# Patient Record
Sex: FEMALE | Race: WHITE
[De-identification: names, ages, dates, MRNs, and addresses within clinical notes are randomized per-mention and may not be internally consistent; named-entity substitution may affect disease eponyms.]

---

## 2019-02-04 ENCOUNTER — HOSPITAL ENCOUNTER (EMERGENCY)
Dept: HOSPITAL 33 - ED | Age: 29
Discharge: HOME | End: 2019-02-04
Payer: MEDICAID

## 2019-02-04 VITALS — DIASTOLIC BLOOD PRESSURE: 61 MMHG | SYSTOLIC BLOOD PRESSURE: 129 MMHG | HEART RATE: 74 BPM

## 2019-02-04 VITALS — OXYGEN SATURATION: 97 %

## 2019-02-04 DIAGNOSIS — Z86.14: ICD-10-CM

## 2019-02-04 DIAGNOSIS — I88.9: Primary | ICD-10-CM

## 2019-02-04 LAB
ANION GAP SERPL CALC-SCNC: 13.3 MEQ/L (ref 5–15)
BASOPHILS # BLD AUTO: 0.02 10*3/UL (ref 0–0.4)
BASOPHILS NFR BLD AUTO: 0.2 % (ref 0–0.4)
BUN SERPL-MCNC: 9 MG/DL (ref 7–17)
CALCIUM SPEC-MCNC: 9 MG/DL (ref 8.4–10.2)
CHLORIDE SERPL-SCNC: 108 MMOL/L (ref 98–107)
CO2 SERPL-SCNC: 23 MMOL/L (ref 22–30)
CREAT SERPL-MCNC: 0.62 MG/DL (ref 0.52–1.04)
EOSINOPHIL # BLD AUTO: 0.2 10*3/UL (ref 0–0.5)
GLUCOSE SERPL-MCNC: 109 MG/DL (ref 74–106)
GRANULOCYTES # BLD AUTO: 5.94 10*3/UL (ref 1.4–6.9)
HCT VFR BLD AUTO: 44.4 % (ref 35–47)
HGB BLD-MCNC: 14.8 GM/DL (ref 12–16)
LYMPHOCYTES # SPEC AUTO: 2.11 10*3/UL (ref 1–4.6)
MCH RBC QN AUTO: 28 PG (ref 26–32)
MCHC RBC AUTO-ENTMCNC: 33.3 G/DL (ref 32–36)
MONOCYTES # BLD AUTO: 1.07 10*3/UL (ref 0–1.3)
NEUTROPHILS NFR BLD AUTO: 63.6 % (ref 36–66)
PLATELET # BLD AUTO: 259 K/MM3 (ref 150–450)
POTASSIUM SERPLBLD-SCNC: 4 MMOL/L (ref 3.5–5.1)
RBC # BLD AUTO: 5.28 M/MM3 (ref 4.1–5.4)
SODIUM SERPL-SCNC: 140 MMOL/L (ref 137–145)
WBC # BLD AUTO: 9.3 K/MM3 (ref 4–10.5)

## 2019-02-04 PROCEDURE — 85025 COMPLETE CBC W/AUTO DIFF WBC: CPT

## 2019-02-04 PROCEDURE — 96372 THER/PROPH/DIAG INJ SC/IM: CPT

## 2019-02-04 PROCEDURE — 81025 URINE PREGNANCY TEST: CPT

## 2019-02-04 PROCEDURE — 36415 COLL VENOUS BLD VENIPUNCTURE: CPT

## 2019-02-04 PROCEDURE — 80048 BASIC METABOLIC PNL TOTAL CA: CPT

## 2019-02-04 PROCEDURE — 99284 EMERGENCY DEPT VISIT MOD MDM: CPT

## 2019-02-04 NOTE — ERPHSYRPT
- History of Present Illness


Time Seen by Provider: 19 08:10


Source: patient


Exam Limitations: no limitations


Patient Subjective Stated Complaint: here for a possible abscess to right groin 

area for 10 days now.


Triage Nursing Assessment: pt has tenderness to right groin area, no swelling, 

drainage noted. pt alert, and walked in ,


Physician History: 





The patient is a 28-year-old female complaining of an abscess to the right 

groin that began 10 days ago.  Early in the infection, the area was red and 

raised.  She has a history of MRSA.  She thought this was another MRSA 

infection.  She took ibuprofen and put hot compresses on the region.  The 

swelling began to become smaller.  There was no drainage of the abscess.  This 

morning when she woke up, it was very painful for her to walk.  It was also 

very painful to touch the area.  As per our discussion, she would like to try a 

trial of antibiotics today without incision and drainage. She states that she 

did not have a fever. 





Her past medical history is significant for MRSA infections.


Timing/Duration: day(s) (10), gradual onset, improved (but now painful)


Quality: painful


Severity: moderate


Location: other (right groin)


Possible Causes: no cause identified


Associated Symptoms: denies symptoms


Allergies/Adverse Reactions: 








No Known Drug Allergies Allergy (Unverified 10/22/16 12:22)


 





Hx Tetanus, Diphtheria Vaccination/Date Given: Yes


Hx Influenza Vaccination/Date Given: Yes


Hx Pneumococcal Vaccination/Date Given: No


Immunizations Up to Date: Yes





- Review of Systems


Constitutional: No Fever, No Chills


Eyes: No Symptoms


Ears, Nose, & Throat: No Symptoms


Respiratory: No Cough, No Dyspnea


Cardiac: No Chest Pain, No Edema, No Syncope


Abdominal/Gastrointestinal: No Abdominal Pain, No Nausea, No Vomiting, No 

Diarrhea


Genitourinary Symptoms: No Dysuria


Musculoskeletal: No Back Pain, No Neck Pain


Skin: Induration


Neurological: No Dizziness, No Focal Weakness, No Sensory Changes


Psychological: No Symptoms


Endocrine: No Symptoms


Hematologic/Lymphatic: No Symptoms


Immunological/Allergic: No Symptoms


All Other Systems: Reviewed and Negative





- Past Medical History


Pertinent Past Medical History: No


Neurological History: No Pertinent History


ENT History: No Pertinent History


Cardiac History: No Pertinent History


Respiratory History: No Pertinent History


Endocrine Medical History: No Pertinent History


Musculoskeletal History: No Pertinent History


GI Medical History: No Pertinent History


 History: No Pertinent History


Psycho-Social History: No Pertinent History


Female Reproductive Disorders: No Pertinent History





- Past Surgical History


Past Surgical History: Yes


Neuro Surgical History: No Pertinent History


Cardiac: No Pertinent History


Respiratory: No Pertinent History


Gastrointestinal: Appendectomy


Genitourinary: No Pertinent History


Musculoskeletal: Orthopedic Surgery


Female Surgical History:  Section


Other Surgical History: r elbow





- Social History


Smoking Status: Current every day smoker


How long have you smoked: 4 years


Exposure to second hand smoke: Yes


Drug Use: none


Patient Lives Alone: Yes





- Female History


Hx Last Menstrual Period: 11 days ago


Hx Pregnant Now: No





- Nursing Vital Signs


Nursing Vital Signs: 


 Initial Vital Signs











Temperature  97.9 F   19 07:48


 


Pulse Rate  82   19 07:48


 


Respiratory Rate  16   19 07:48


 


Blood Pressure  115/80   19 07:48


 


O2 Sat by Pulse Oximetry  97   19 07:48








 Pain Scale











Pain Intensity                 4

















- Physical Exam


General Appearance: no apparent distress, alert


Eye Exam: PERRL/EOMI, eyes nml inspection


Ears, Nose, Throat Exam: normal ENT inspection, pharynx normal, moist mucous 

membranes


Neck Exam: normal inspection, non-tender, supple, full range of motion


Respiratory Exam: normal breath sounds, lungs clear, No respiratory distress


Cardiovascular Exam: regular rate/rhythm, normal heart sounds


Gastrointestinal/Abdomen Exam: soft, mass, No tenderness


Pelvic Exam: not done


Rectal Exam: not done


Back Exam: normal inspection, normal range of motion, No CVA tenderness, No 

vertebral tenderness


Extremity Exam: normal inspection, normal range of motion


Neurologic Exam: alert, oriented x 3, cooperative, normal mood/affect, 

sensation nml, No motor deficits


Skin Exam: other (Examination of the fold of the right groin shows a healed 

small wound to the mid groin fold.  There is very small palpable tender 0.5 x 1 

cm hard mass.  This mass is exquisitely tender to light palpation.  There is no 

fluctuance.)


**SpO2 Interpretation**: normal


SpO2: 97


Ordered Tests: 


 Active Orders 24 hr











 Category Date Time Status


 


 BMP Stat Lab  19 08:10 Completed


 


 CBC W DIFF Stat Lab  19 08:10 Completed


 


 HCG QUALITATIVE,SERUM Stat Lab  19 08:10 Completed








Medication Summary














Discontinued Medications














Generic Name Dose Route Start Last Admin





  Trade Name Freq  PRN Reason Stop Dose Admin


 


Ketorolac Tromethamine  60 mg  19 08:12  19 08:20





  Toradol 30 Mg Injection***  IM  19 08:13  60 mg





  STAT ONE   Administration





     





     





     





     


 


Ketorolac Tromethamine  Confirm  19 08:19  





  Toradol 30 Mg Injection***  Administered  19 08:20  





  Dose   





  60 mg   





  .ROUTE   





  .STK-MED ONE   





     





     





     





     











Lab/Rad Data: 


 Laboratory Result Diagrams





 19 08:10 





 19 08:10 





 Laboratory Results











  19 Range/Units





  08:10 08:10 08:10 


 


WBC    9.3  (4.0-10.5)  K/mm3


 


RBC    5.28  (4.1-5.4)  M/mm3


 


Hgb    14.8  (12.0-16.0)  gm/dl


 


Hct    44.4  (35-47)  %


 


MCV    84.1  ()  fl


 


MCH    28.0  (26-32)  pg


 


MCHC    33.3  (32-36)  g/dl


 


RDW    14.6 H  (11.5-14.0)  %


 


Plt Count    259  (150-450)  K/mm3


 


MPV    9.9 H  (6-9.5)  fl


 


Gran %    63.6  (36.0-66.0)  %


 


Eos # (Auto)    0.20  (0-0.5)  


 


Absolute Lymphs (auto)    2.11  (1.0-4.6)  


 


Absolute Monos (auto)    1.07  (0.0-1.3)  


 


Lymphocytes %    22.6 L  (24.0-44.0)  %


 


Monocytes %    11.5  (0.0-12.0)  %


 


Eosinophils %    2.1  (0.00-5.0)  %


 


Basophils %    0.2  (0.0-0.4)  %


 


Absolute Granulocytes    5.94  (1.4-6.9)  


 


Basophils #    0.02  (0-0.4)  


 


Sodium   140   (137-145)  mmol/L


 


Potassium   4.0   (3.5-5.1)  mmol/L


 


Chloride   108 H   ()  mmol/L


 


Carbon Dioxide   23   (22-30)  mmol/L


 


Anion Gap   13.3   (5-15)  MEQ/L


 


BUN   9   (7-17)  mg/dL


 


Creatinine   0.62   (0.52-1.04)  mg/dL


 


Estimated GFR   > 60.0   ML/MIN


 


Glucose   109 H   ()  mg/dL


 


Calcium   9.0   (8.4-10.2)  mg/dL


 


Serum Pregnancy, Qual  NEGATIVE    (Negative)  














- Progress


Progress: improved


Progress Note: 





19 08:36


The tender palpable mass in the right groin could be a small painful indurated 

abscess.  However, it also could be a tender lymph node.  I discussed treatment 

options with the patient.  She prefers to have a trial of antibiotics today and 

reassess over the next few days.


19 09:10








Pt better after toradol 60 mg IM.


Counseled pt/family regarding: diagnosis, need for follow-up





- Departure


Time of Disposition: 09:12


Departure Disposition: Home


Clinical Impression: 


 Lymphadenitis





Condition: Stable


Critical Care Time: No


Referrals: 


BHAVESH ROBBINS MD [Primary Care Provider] - 


Additional Instructions: 


You likely have a swollen lymph node as result of the abscess that you had for 

10 days.  You were given Toradol 60 mg by IM in the ER.  As per our discussion, 

we will try clindamycin 300 mg 4 times a day for 10 days.  You may also take 

Tylenol No. 3 one tablet every 4-6 hours as needed for pain.  Take Tylenol and 

ibuprofen as needed as well.  Follow-up with your primary medical doctor in one 

to 2 days.


Prescriptions: 


Clindamycin HCl 300 mg PO QID #40 capsule


Codeine Phosphate/APAP #3** [Tylenol #3 Tablet**] 1 tab PO Q4-6HPRN PRN #6 

tablet


 PRN Reason: Mild To Moderate Pain

## 2019-06-27 ENCOUNTER — HOSPITAL ENCOUNTER (EMERGENCY)
Dept: HOSPITAL 33 - ED | Age: 29
Discharge: HOME | End: 2019-06-27
Payer: MEDICAID

## 2019-06-27 VITALS — SYSTOLIC BLOOD PRESSURE: 120 MMHG | OXYGEN SATURATION: 95 % | DIASTOLIC BLOOD PRESSURE: 76 MMHG | HEART RATE: 89 BPM

## 2019-06-27 DIAGNOSIS — N39.0: ICD-10-CM

## 2019-06-27 DIAGNOSIS — R10.9: Primary | ICD-10-CM

## 2019-06-27 DIAGNOSIS — N83.202: ICD-10-CM

## 2019-06-27 LAB
ALBUMIN SERPL-MCNC: 4.1 G/DL (ref 3.5–5)
ALP SERPL-CCNC: 71 U/L (ref 38–126)
ALT SERPL-CCNC: 24 U/L (ref 0–35)
AMYLASE SERPL-CCNC: 96 U/L (ref 30–110)
ANION GAP SERPL CALC-SCNC: 12.6 MEQ/L (ref 5–15)
AST SERPL QL: 19 U/L (ref 14–36)
BASOPHILS # BLD AUTO: 0.03 10*3/UL (ref 0–0.4)
BASOPHILS NFR BLD AUTO: 0.3 % (ref 0–0.4)
BILIRUB BLD-MCNC: 0.3 MG/DL (ref 0.2–1.3)
BUN SERPL-MCNC: 8 MG/DL (ref 7–17)
CALCIUM SPEC-MCNC: 9.5 MG/DL (ref 8.4–10.2)
CHLORIDE SERPL-SCNC: 106 MMOL/L (ref 98–107)
CO2 SERPL-SCNC: 25 MMOL/L (ref 22–30)
CREAT SERPL-MCNC: 0.62 MG/DL (ref 0.52–1.04)
EOSINOPHIL # BLD AUTO: 0.23 10*3/UL (ref 0–0.5)
GLUCOSE SERPL-MCNC: 101 MG/DL (ref 74–106)
GLUCOSE UR-MCNC: NEGATIVE MG/DL
GRANULOCYTES # BLD AUTO: 6.09 10*3/UL (ref 1.4–6.9)
HCT VFR BLD AUTO: 42.8 % (ref 35–47)
HGB BLD-MCNC: 14.5 GM/DL (ref 12–16)
LYMPHOCYTES # SPEC AUTO: 2.98 10*3/UL (ref 1–4.6)
MCH RBC QN AUTO: 28.8 PG (ref 26–32)
MCHC RBC AUTO-ENTMCNC: 33.9 G/DL (ref 32–36)
MONOCYTES # BLD AUTO: 1.04 10*3/UL (ref 0–1.3)
NEUTROPHILS NFR BLD AUTO: 58.8 % (ref 36–66)
PLATELET # BLD AUTO: 246 K/MM3 (ref 150–450)
POTASSIUM SERPLBLD-SCNC: 3.6 MMOL/L (ref 3.5–5.1)
PROT SERPL-MCNC: 7.1 G/DL (ref 6.3–8.2)
PROT UR STRIP-MCNC: NEGATIVE MG/DL
RBC # BLD AUTO: 5.04 M/MM3 (ref 4.1–5.4)
RBC #/AREA URNS HPF: (no result) /HPF (ref 0–2)
SODIUM SERPL-SCNC: 140 MMOL/L (ref 137–145)
WBC # BLD AUTO: 10.4 K/MM3 (ref 4–10.5)
WBC #/AREA URNS HPF: (no result) /HPF (ref 0–5)

## 2019-06-27 PROCEDURE — 85025 COMPLETE CBC W/AUTO DIFF WBC: CPT

## 2019-06-27 PROCEDURE — 87086 URINE CULTURE/COLONY COUNT: CPT

## 2019-06-27 PROCEDURE — 74176 CT ABD & PELVIS W/O CONTRAST: CPT

## 2019-06-27 PROCEDURE — 96360 HYDRATION IV INFUSION INIT: CPT

## 2019-06-27 PROCEDURE — 83690 ASSAY OF LIPASE: CPT

## 2019-06-27 PROCEDURE — 84703 CHORIONIC GONADOTROPIN ASSAY: CPT

## 2019-06-27 PROCEDURE — 36415 COLL VENOUS BLD VENIPUNCTURE: CPT

## 2019-06-27 PROCEDURE — 36000 PLACE NEEDLE IN VEIN: CPT

## 2019-06-27 PROCEDURE — 96376 TX/PRO/DX INJ SAME DRUG ADON: CPT

## 2019-06-27 PROCEDURE — 83605 ASSAY OF LACTIC ACID: CPT

## 2019-06-27 PROCEDURE — 82150 ASSAY OF AMYLASE: CPT

## 2019-06-27 PROCEDURE — 99284 EMERGENCY DEPT VISIT MOD MDM: CPT

## 2019-06-27 PROCEDURE — 96374 THER/PROPH/DIAG INJ IV PUSH: CPT

## 2019-06-27 PROCEDURE — 96375 TX/PRO/DX INJ NEW DRUG ADDON: CPT

## 2019-06-27 PROCEDURE — 96365 THER/PROPH/DIAG IV INF INIT: CPT

## 2019-06-27 PROCEDURE — 80053 COMPREHEN METABOLIC PANEL: CPT

## 2019-06-27 PROCEDURE — 81001 URINALYSIS AUTO W/SCOPE: CPT

## 2019-06-27 NOTE — ERPHSYRPT
- History of Present Illness


Time Seen by Provider: 19 20:50


Historian: patient, family


Exam Limitations: no limitations


Physician History: 





29 y/o white female presents with bilat flank pain. began this am and worse. no 

vaginal bleeding or hematuria today but in the last 3 months, pt states she has 

had intermittent vaginal and rectal bleeding. no sig abd pain. no vomiting. 

much worse than any uti in past


Timing/Duration: today, constant, sudden, worse


Quality: sharpness, stabbing


Abdominal Pain Onset Location: suprapubic, flank (bilateral)


Pain Radiation: flank


Severity of Pain-Max: moderate


Severity of Pain-Current: moderate


Modifying Factors: Improves With: nothing


Associated Symptoms: No nausea, No vomiting


Previous symptoms: no prior history


Allergies/Adverse Reactions: 








No Known Drug Allergies Allergy (Unverified 10/22/16 12:22)


 





Hx Tetanus, Diphtheria Vaccination/Date Given: Yes


Hx Influenza Vaccination/Date Given: Yes


Hx Pneumococcal Vaccination/Date Given: No





- Review of Systems


Constitutional: No Symptoms


Eyes: No Symptoms


Ears, Nose, & Throat: No Symptoms


Respiratory: No Symptoms


Cardiac: No Symptoms


Abdominal/Gastrointestinal: Abdominal Pain (suprapubic pain)


Genitourinary Symptoms: No Symptoms


Musculoskeletal: Back Pain (bilat flank)


Skin: No Symptoms


Neurological: No Symptoms


Psychological: No Symptoms


Endocrine: No Symptoms


Hematologic/Lymphatic: No Symptoms


Immunological/Allergic: No Symptoms


All Other Systems: Reviewed and Negative





- Past Medical History


Pertinent Past Medical History: No


Neurological History: No Pertinent History


ENT History: No Pertinent History


Cardiac History: No Pertinent History


Respiratory History: No Pertinent History


Endocrine Medical History: No Pertinent History


Musculoskeletal History: No Pertinent History


GI Medical History: No Pertinent History


 History: No Pertinent History


Psycho-Social History: No Pertinent History


Female Reproductive Disorders: No Pertinent History





- Past Surgical History


Past Surgical History: Yes


Neuro Surgical History: No Pertinent History


Cardiac: No Pertinent History


Respiratory: No Pertinent History


Gastrointestinal: Appendectomy


Genitourinary: No Pertinent History


Musculoskeletal: Orthopedic Surgery


Female Surgical History:  Section


Other Surgical History: r elbow





- Social History


Smoking Status: Current every day smoker


How long have you smoked: 4 years


Exposure to second hand smoke: Yes


Drug Use: none


Patient Lives Alone: Yes





- Nursing Vital Signs


Nursing Vital Signs: 


 Initial Vital Signs











Pulse Rate  61   19 20:50


 


Respiratory Rate  16   19 20:50


 


Blood Pressure  140/82   19 20:50


 


O2 Sat by Pulse Oximetry  98   19 20:50








 Pain Scale











Pain Intensity                 3

















- Physical Exam


General Appearance: moderate distress, alert, anxiety


Eye Exam: PERRL/EOMI, eyes nml inspection


Ears, Nose, Throat Exam: normal ENT inspection, moist mucous membranes


Neck Exam: normal inspection, non-tender, supple, full range of motion


Respiratory Exam: normal breath sounds, lungs clear, airway intact, No chest 

tenderness, No respiratory distress


Cardiovascular Exam: regular rate/rhythm, normal heart sounds, normal 

peripheral pulses


Gastrointestinal/Abdomen Exam: soft, tenderness (suprapubic), guarding, No 

rebound


Pelvic Exam: not done


Rectal Exam: not done


Back Exam: normal inspection, normal range of motion, CVA tenderness (bilat), 

No vertebral tenderness


Extremity Exam: normal inspection, normal range of motion, pelvis stable


Neurologic Exam: alert, cooperative, CNs II-XII nml as tested


Skin Exam: normal color, warm, dry


Lymphatic Exam: No adenopathy


**SpO2 Interpretation**: normal


O2 Delivery: Room Air





- Course


Nursing assessment & vital signs reviewed: Yes


Ordered Tests: 


 Active Orders 24 hr











 Category Date Time Status


 


 IV Insertion STAT Care  19 20:59 Active


 


 ABDOMEN AND PELVIS W/0 CONTRAS [CT] Stat Exams  19 21:35 Taken


 


 AMYLASE Stat Lab  19 21:23 Completed


 


 CBC W DIFF Stat Lab  19 21:23 Completed


 


 CMP Stat Lab  19 21:23 Completed


 


 CULTURE,URINE Stat Lab  19 22:15 Received


 


 HCG,QUALITATIVE URINE Stat Lab  19 22:15 Completed


 


 LIPASE Stat Lab  19 21:23 Completed


 


 Lactic Acid Stat Lab  19 21:16 Completed


 


 UA W/RFX UR CULTURE Stat Lab  19 22:15 Completed








Medication Summary











Generic Name Dose Route Start Last Admin





  Trade Name Freq  PRN Reason Stop Dose Admin


 


Ceftriaxone Sodium/Dextrose  1 g in 50 mls @ 100 mls/hr  19 22:43  





  Rocephin 1 Gm-D5w 50 Ml Bag**  IV  19 23:12  





  STAT STA   





     





     





     





     














Discontinued Medications














Generic Name Dose Route Start Last Admin





  Trade Name Freq  PRN Reason Stop Dose Admin


 


Hydromorphone HCl  1 mg  19 20:59  19 21:20





  Hydromorphone 1 Mg/Ml Ampule***  IV  19 21:00  1 mg





  STAT ONE   Administration





     





     





     





     


 


Hydromorphone HCl  Confirm  19 21:17  





  Hydromorphone 1 Mg/Ml Ampule***  Administered  19 21:18  





  Dose   





  1 mg   





  .ROUTE   





  .STK-MED ONE   





     





     





     





     


 


Sodium Chloride  1,000 mls @ 999 mls/hr  19 20:59  19 21:20





  Sodium Chloride 0.9% 1000 Ml  IV  19 21:59  999 mls/hr





  .Q1H1M STA   Administration





     





     





     





     


 


Sodium Chloride  Confirm  19 21:17  





  Sodium Chloride 0.9% 1000 Ml  Administered  19 21:18  





  Dose   





  1,000 mls @ ud   





  .ROUTE   





  .STK-MED ONE   





     





     





     





     


 


Ondansetron HCl  4 mg  19 20:59  19 21:20





  Zofran 4 Mg/2 Ml Vial**  IV  19 21:00  4 mg





  STAT ONE   Administration





     





     





     





     


 


Ondansetron HCl  Confirm  19 21:16  





  Zofran 4 Mg/2 Ml Vial**  Administered  19 21:17  





  Dose   





  4 mg   





  .ROUTE   





  .STK-MED ONE   





     





     





     





     











Lab/Rad Data: 


 Laboratory Result Diagrams





 19 21:23 





 19 21:23 





 Laboratory Results











  19 Range/Units





  22:15 22:15 21:23 


 


WBC     (4.0-10.5)  K/mm3


 


RBC     (4.1-5.4)  M/mm3


 


Hgb     (12.0-16.0)  gm/dl


 


Hct     (35-47)  %


 


MCV     ()  fl


 


MCH     (26-32)  pg


 


MCHC     (32-36)  g/dl


 


RDW     (11.5-14.0)  %


 


Plt Count     (150-450)  K/mm3


 


MPV     (6-9.5)  fl


 


Gran %     (36.0-66.0)  %


 


Eos # (Auto)     (0-0.5)  


 


Absolute Lymphs (auto)     (1.0-4.6)  


 


Absolute Monos (auto)     (0.0-1.3)  


 


Lymphocytes %     (24.0-44.0)  %


 


Monocytes %     (0.0-12.0)  %


 


Eosinophils %     (0.00-5.0)  %


 


Basophils %     (0.0-0.4)  %


 


Absolute Granulocytes     (1.4-6.9)  


 


Basophils #     (0-0.4)  


 


Sodium    140  (137-145)  mmol/L


 


Potassium    3.6  (3.5-5.1)  mmol/L


 


Chloride    106  ()  mmol/L


 


Carbon Dioxide    25  (22-30)  mmol/L


 


Anion Gap    12.6  (5-15)  MEQ/L


 


BUN    8  (7-17)  mg/dL


 


Creatinine    0.62  (0.52-1.04)  mg/dL


 


Estimated GFR    > 60.0  ML/MIN


 


Glucose    101  ()  mg/dL


 


Lactic Acid     (0.4-2.0)  


 


Calcium    9.5  (8.4-10.2)  mg/dL


 


Total Bilirubin    0.30  (0.2-1.3)  mg/dL


 


AST    19  (14-36)  U/L


 


ALT    24  (0-35)  U/L


 


Alkaline Phosphatase    71  ()  U/L


 


Serum Total Protein    7.1  (6.3-8.2)  g/dL


 


Albumin    4.1  (3.5-5.0)  g/dL


 


Amylase    96  ()  U/L


 


Lipase    70  ()  U/L


 


Urine Color   YELLOW   (YELLOW)  


 


Urine Appearance   CLOUDY   (CLEAR)  


 


Urine pH   5.0   (5-6)  


 


Ur Specific Gravity   1.028   (1.005-1.025)  


 


Urine Protein   NEGATIVE   (Negative)  


 


Urine Ketones   NEGATIVE   (NEGATIVE)  


 


Urine Blood   NEGATIVE   (0-5)  Marvin/ul


 


Urine Nitrite   NEGATIVE   (NEGATIVE)  


 


Urine Bilirubin   NEGATIVE   (NEGATIVE)  


 


Urine Urobilinogen   NEGATIVE   (0-1)  mg/dL


 


Ur Leukocyte Esterase   MODERATE   (NEGATIVE)  


 


Urine WBC (Auto)      (0-5)  /HPF


 


Urine RBC (Auto)   26-50   (0-2)  /HPF


 


U Epithel Cells (Auto)   FEW   (FEW)  /HPF


 


Urine Bacteria (Auto)   FEW   (NEGATIVE)  /HPF


 


Urine Mucus (Auto)   MANY   (NEGATIVE)  /HPF


 


Urine Culture Reflexed   YES   (NO)  


 


Urine Glucose   NEGATIVE   (NEGATIVE)  mg/dL


 


Urine HCG, Qual  NEGATIVE    (Negative)  














  19 Range/Units





  21:23 21:16 


 


WBC  10.4   (4.0-10.5)  K/mm3


 


RBC  5.04   (4.1-5.4)  M/mm3


 


Hgb  14.5   (12.0-16.0)  gm/dl


 


Hct  42.8   (35-47)  %


 


MCV  84.9   ()  fl


 


MCH  28.8   (26-32)  pg


 


MCHC  33.9   (32-36)  g/dl


 


RDW  14.2 H   (11.5-14.0)  %


 


Plt Count  246   (150-450)  K/mm3


 


MPV  10.0 H   (6-9.5)  fl


 


Gran %  58.8   (36.0-66.0)  %


 


Eos # (Auto)  0.23   (0-0.5)  


 


Absolute Lymphs (auto)  2.98   (1.0-4.6)  


 


Absolute Monos (auto)  1.04   (0.0-1.3)  


 


Lymphocytes %  28.7   (24.0-44.0)  %


 


Monocytes %  10.0   (0.0-12.0)  %


 


Eosinophils %  2.2   (0.00-5.0)  %


 


Basophils %  0.3   (0.0-0.4)  %


 


Absolute Granulocytes  6.09   (1.4-6.9)  


 


Basophils #  0.03   (0-0.4)  


 


Sodium    (137-145)  mmol/L


 


Potassium    (3.5-5.1)  mmol/L


 


Chloride    ()  mmol/L


 


Carbon Dioxide    (22-30)  mmol/L


 


Anion Gap    (5-15)  MEQ/L


 


BUN    (7-17)  mg/dL


 


Creatinine    (0.52-1.04)  mg/dL


 


Estimated GFR    ML/MIN


 


Glucose    ()  mg/dL


 


Lactic Acid   1.2  (0.4-2.0)  


 


Calcium    (8.4-10.2)  mg/dL


 


Total Bilirubin    (0.2-1.3)  mg/dL


 


AST    (14-36)  U/L


 


ALT    (0-35)  U/L


 


Alkaline Phosphatase    ()  U/L


 


Serum Total Protein    (6.3-8.2)  g/dL


 


Albumin    (3.5-5.0)  g/dL


 


Amylase    ()  U/L


 


Lipase    ()  U/L


 


Urine Color    (YELLOW)  


 


Urine Appearance    (CLEAR)  


 


Urine pH    (5-6)  


 


Ur Specific Gravity    (1.005-1.025)  


 


Urine Protein    (Negative)  


 


Urine Ketones    (NEGATIVE)  


 


Urine Blood    (0-5)  Marvin/ul


 


Urine Nitrite    (NEGATIVE)  


 


Urine Bilirubin    (NEGATIVE)  


 


Urine Urobilinogen    (0-1)  mg/dL


 


Ur Leukocyte Esterase    (NEGATIVE)  


 


Urine WBC (Auto)    (0-5)  /HPF


 


Urine RBC (Auto)    (0-2)  /HPF


 


U Epithel Cells (Auto)    (FEW)  /HPF


 


Urine Bacteria (Auto)    (NEGATIVE)  /HPF


 


Urine Mucus (Auto)    (NEGATIVE)  /HPF


 


Urine Culture Reflexed    (NO)  


 


Urine Glucose    (NEGATIVE)  mg/dL


 


Urine HCG, Qual    (Negative)  














- Progress


Progress: improved


Progress Note: 





19 22:44


ct abd/pelvis-3.7 cm left ovarian cyst; tiny amt of culdesac fluid. new tiny 

gallstone. 


Counseled pt/family regarding: lab results, diagnosis, need for follow-up, rad 

results





- Departure


Departure Disposition: Home


Clinical Impression: 


 Abdominal pain, UTI (urinary tract infection), Left ovarian cyst





Condition: Stable


Critical Care Time: No


Referrals: 


BHAVESH ROBBINS MD [Primary Care Provider] - 


Additional Instructions: 


drink plenty of fluids. follow up primary doctor for further management of your 

abdominal issues and ovarian cyst. 


Prescriptions: 


Hydrocodone/APAP 5/325*** [Norco 5/325 mg***] 1 each PO Q8H PRN PRN #6 tablet 

MDD 3


 PRN Reason: Pain


Ciprofloxacin [Cipro 500 MG***] 500 mg PO BID #14 tablet

## 2020-07-09 ENCOUNTER — HOSPITAL ENCOUNTER (EMERGENCY)
Dept: HOSPITAL 33 - ED | Age: 30
Discharge: HOME | End: 2020-07-09
Payer: MEDICAID

## 2020-07-09 VITALS — SYSTOLIC BLOOD PRESSURE: 137 MMHG | OXYGEN SATURATION: 97 % | DIASTOLIC BLOOD PRESSURE: 81 MMHG | HEART RATE: 83 BPM

## 2020-07-09 DIAGNOSIS — H00.012: Primary | ICD-10-CM

## 2020-07-09 PROCEDURE — 99283 EMERGENCY DEPT VISIT LOW MDM: CPT

## 2020-07-09 NOTE — ERPHSYRPT
- History of Present Illness


Time Seen by Provider: 20 16:42


Source: patient


Exam Limitations: no limitations


Physician History: 





29 years old female presented in the ER with chief complaint of right eye lower 

lid swelling with some burning aching pain.  Patient report she started to have 

some discomfort and right lower eyelid yesterday and today has noticed small 

swelling which is gradually increasing.  No difficulty vision or movements of 

eyeball.  Denies any discharge.


Timing/Duration: yesterday, gradual onset, worse


Location: right eye


Severity: mild


Apparent Injury: no


Associated Symptoms: pain, burning, itching, redness


Visual Assistive Devices: None


Chemical Exposure: No


Trauma: No


[place]/Tanning Bed Exposure: No


Allergies/Adverse Reactions: 








No Known Drug Allergies Allergy (Verified 20 16:50)


   





Home Medications: 








No Reportable Medications [No Reported Medications]  20 [History]





Hx Tetanus, Diphtheria Vaccination/Date Given: Yes


Hx Influenza Vaccination/Date Given: Yes


Hx Pneumococcal Vaccination/Date Given: No





- Review of Systems


Constitutional: No Symptoms


Eyes: Eye Pain, Itchy


Ears, Nose, & Throat: No Symptoms


Respiratory: No Symptoms


Cardiac: No Symptoms


Abdominal/Gastrointestinal: No Symptoms


Musculoskeletal: No Symptoms


Skin: No Symptoms


Neurological: No Symptoms


Psychological: No Symptoms


Endocrine: No Symptoms


Hematologic/Lymphatic: No Symptoms


Immunological/Allergic: No Symptoms





- Past Medical History


Pertinent Past Medical History: No


Neurological History: No Pertinent History


ENT History: No Pertinent History


Cardiac History: No Pertinent History


Respiratory History: No Pertinent History


Endocrine Medical History: No Pertinent History


Musculoskeletal History: No Pertinent History


GI Medical History: No Pertinent History


 History: No Pertinent History


Psycho-Social History: No Pertinent History


Female Reproductive Disorders: No Pertinent History


Other Medical History: kidney infection





- Past Surgical History


Past Surgical History: Yes


Neuro Surgical History: No Pertinent History


Cardiac: No Pertinent History


Respiratory: No Pertinent History


Gastrointestinal: Appendectomy


Genitourinary: No Pertinent History


Musculoskeletal: Orthopedic Surgery


Female Surgical History:  Section


Other Surgical History: r elbow





- Social History


Smoking Status: Current every day smoker


How long have you smoked: 4 years


Exposure to second hand smoke: Yes


Drug Use: none


Patient Lives Alone: Yes





- Nursing Vital Signs


Nursing Vital Signs: 


                               Initial Vital Signs











Temperature  98.5 F   20 16:41


 


Pulse Rate  83   20 16:41


 


Blood Pressure  137/81   20 16:41


 


O2 Sat by Pulse Oximetry  97   20 16:41








                                   Pain Scale











Pain Intensity                 3

















- Physical Exam


General Appearance: no apparent distress, alert


Vision Acuity Degree Evaluation Phase: Uncorrected


Vision Acuity Right Eye: 20/30


Vision Acuity Left Eye: 20/50


Eye Exam: right eye: eyelid inflammation (Right lower lid medial and bumpy 

inflammation.  Warm and tender), stye, left eye: normal inspection, PERRL, EOMI


Ears, Nose, Throat Exam: normal ENT inspection, TMs normal, pharynx normal


Neck Exam: normal inspection, non-tender, supple, full range of motion


Respiratory Exam: normal breath sounds, lungs clear


Cardiovascular Exam: regular rate/rhythm, normal heart sounds


Neurologic: alert, oriented x 3, cooperative, CNs II-XII nml as tested, normal 

mood/affect, nml cerebellar function


Skin Exam: normal color


**SpO2 Interpretation**: normal


O2 Delivery: Room Air





- Course


Nursing assessment & vital signs reviewed: Yes





- Progress


Progress: unchanged


Progress Note: 





20 16:54


She has stye.  Recommended supportive care with moist warm compresses and 

outpatient follow-up.  Discussed signs symptoms of worsening needing return 

which she seemed understanding.


Counseled pt/family regarding: diagnosis, need for follow-up





- Departure


Departure Disposition: Home


Clinical Impression: 


Hordeolum externum (stye)


Qualifiers:


 Laterality: right Eyelid: lower Qualified Code(s): H00.012 - Hordeolum externum

 right lower eyelid





Condition: Stable


Critical Care Time: No


Referrals: 


BHAVESH ROBBINS MD [Primary Care Provider] - Follow Up with PCP/3 days


Instructions:  Stye (DC)


Additional Instructions: 


Apply moist warm compresses for 5 to 10 minutes three to five times per day in 

order to facilitate drainage. Massage and gentle wiping of the affected eyelid 

after the warm compress can also aid in drainage. Patients should discontinue 

eye makeup to support healing.  Follow-up with primary care for reevaluation.

## 2020-10-15 ENCOUNTER — HOSPITAL ENCOUNTER (EMERGENCY)
Dept: HOSPITAL 33 - ED | Age: 30
LOS: 1 days | Discharge: HOME | End: 2020-10-16
Payer: MEDICAID

## 2020-10-15 DIAGNOSIS — N94.89: ICD-10-CM

## 2020-10-15 DIAGNOSIS — D72.829: ICD-10-CM

## 2020-10-15 DIAGNOSIS — K64.4: ICD-10-CM

## 2020-10-15 DIAGNOSIS — J84.10: ICD-10-CM

## 2020-10-15 DIAGNOSIS — N39.0: ICD-10-CM

## 2020-10-15 DIAGNOSIS — K92.1: Primary | ICD-10-CM

## 2020-10-15 DIAGNOSIS — K42.9: ICD-10-CM

## 2020-10-15 DIAGNOSIS — R91.1: ICD-10-CM

## 2020-10-15 PROCEDURE — 99284 EMERGENCY DEPT VISIT MOD MDM: CPT

## 2020-10-15 PROCEDURE — 81001 URINALYSIS AUTO W/SCOPE: CPT

## 2020-10-15 PROCEDURE — 85025 COMPLETE CBC W/AUTO DIFF WBC: CPT

## 2020-10-15 PROCEDURE — 83690 ASSAY OF LIPASE: CPT

## 2020-10-15 PROCEDURE — 96372 THER/PROPH/DIAG INJ SC/IM: CPT

## 2020-10-15 PROCEDURE — 80053 COMPREHEN METABOLIC PANEL: CPT

## 2020-10-15 PROCEDURE — 96360 HYDRATION IV INFUSION INIT: CPT

## 2020-10-15 PROCEDURE — 36415 COLL VENOUS BLD VENIPUNCTURE: CPT

## 2020-10-15 PROCEDURE — 36000 PLACE NEEDLE IN VEIN: CPT

## 2020-10-15 PROCEDURE — 74177 CT ABD & PELVIS W/CONTRAST: CPT

## 2020-10-16 VITALS — OXYGEN SATURATION: 97 % | HEART RATE: 82 BPM

## 2020-10-16 VITALS — DIASTOLIC BLOOD PRESSURE: 78 MMHG | SYSTOLIC BLOOD PRESSURE: 121 MMHG

## 2020-10-16 LAB
ALBUMIN SERPL-MCNC: 4.2 G/DL (ref 3.5–5)
ALP SERPL-CCNC: 69 U/L (ref 38–126)
ALT SERPL-CCNC: 22 U/L (ref 0–35)
ANION GAP SERPL CALC-SCNC: 9.8 MEQ/L (ref 5–15)
AST SERPL QL: 19 U/L (ref 14–36)
BASOPHILS # BLD AUTO: 0.03 10*3/UL (ref 0–0.4)
BASOPHILS NFR BLD AUTO: 0.2 % (ref 0–0.4)
BILIRUB BLD-MCNC: 0.2 MG/DL (ref 0.2–1.3)
BUN SERPL-MCNC: 9 MG/DL (ref 7–17)
CALCIUM SPEC-MCNC: 9.3 MG/DL (ref 8.4–10.2)
CHLORIDE SERPL-SCNC: 106 MMOL/L (ref 98–107)
CO2 SERPL-SCNC: 24 MMOL/L (ref 22–30)
CREAT SERPL-MCNC: 0.6 MG/DL (ref 0.52–1.04)
EOSINOPHIL # BLD AUTO: 0.27 10*3/UL (ref 0–0.5)
GFR SERPLBLD BASED ON 1.73 SQ M-ARVRAT: > 60 ML/MIN
GLUCOSE SERPL-MCNC: 125 MG/DL (ref 74–106)
GLUCOSE UR-MCNC: NEGATIVE MG/DL
HCT VFR BLD AUTO: 43.8 % (ref 35–47)
HGB BLD-MCNC: 14.5 GM/DL (ref 12–16)
LIPASE SERPL-CCNC: 68 U/L (ref 23–300)
LYMPHOCYTES # SPEC AUTO: 3.23 10*3/UL (ref 1–4.6)
MCH RBC QN AUTO: 28.4 PG (ref 26–32)
MCHC RBC AUTO-ENTMCNC: 33.1 G/DL (ref 32–36)
MONOCYTES # BLD AUTO: 1.39 10*3/UL (ref 0–1.3)
PLATELET # BLD AUTO: 263 K/MM3 (ref 150–450)
POTASSIUM SERPLBLD-SCNC: 4 MMOL/L (ref 3.5–5.1)
PROT SERPL-MCNC: 7.3 G/DL (ref 6.3–8.2)
PROT UR STRIP-MCNC: NEGATIVE MG/DL
RBC # BLD AUTO: 5.11 M/MM3 (ref 4.1–5.4)
RBC #/AREA URNS HPF: (no result) /HPF (ref 0–2)
SODIUM SERPL-SCNC: 135 MMOL/L (ref 137–145)
WBC # BLD AUTO: 14.4 K/MM3 (ref 4–10.5)

## 2020-10-16 NOTE — XRAY
Indication: Rectal pain and bleeding.  Diverticulitis.



Multiple contiguous axial images obtained through the abdomen and pelvis using

80 cc Isovue 370 contrast only.



Comparison: June 27, 2019.



Lung bases demonstrates stable right lower lobe calcified granulomas.  No

infiltrate or effusion.  Heart is not enlarged.



Stomach is distended with food/fluid.  Noncontrasted stomach and bowel loops

remain nonobstructed.  Appendectomy reported.  No abnormal bowel wall

thickening or inflammatory changes.  Left ovary now demonstrates a 1.5 cm

lesion slightly hyperdense possibly viscous cyst, hemorrhagic cyst, or

collapsing follicular cyst.  There is again tiny cul-de-sac fluid presumed

physiologic from rupture/leaking cyst.  No walled off fluid collection or free

air.



Remaining liver, gallbladder, pancreas, spleen, adrenal glands, kidneys,

ureters, bladder, uterus, and aorta appear normal in CT appearance and

attenuation.  No pathologic retroperitoneal lymphadenopathy.  Osseous

structures intact.  No ventral or inguinal hernias.



Impression:

1.  Small left ovary slightly hyperdense lesion either viscus cyst,

hemorrhagic cyst, or collapsing cyst.  More likely suspect collapsing cyst as

there is tiny cul-de-sac fluid.

2.  Stable right lower lobe calcified granulomas.

3.  Remaining CT abdomen/pelvis with contrast exam is negative.



Comment: Preliminary interpretation was made by [facility].  No critical discrepancy.

## 2020-10-16 NOTE — ERPHSYRPT
- History of Present Illness


Time Seen by Provider: 10/15/20 23:50


Source: patient


Patient Subjective Stated Complaint: pt states "It looks like a blood bath went 

on in the toliet." "I burp up rotten eggs."


Triage Nursing Assessment: pt ambulated into the er; pt is axo x4; c/o rectal 

bleeding; states 7/10 to rectum; abd is round, soft, tender to the touch; 

hyperactive bowel sounds to all quads; external hemrrhoids present; foul 

smelling burp; vital sounds wnl


Physician History: 





Patient is a 30-year-old female presents to our ED for evaluation of bright red 

blood per rectum during bowel movement.  Patient states that she also senses a 

smell consistent with rotten egg when she burps.  She is experiencing left lower

abdominal tenderness.  Symptoms started today.  Symptoms have been constant.  No

specific worsening or improving factors.  No trauma.  No fever.  No rash.  No 

new foods.  Symptoms are mild to moderate in intensity.  No specific worsening 

or improving factors.  Patient is otherwise healthy.  She voices no other 

complaints or concerns at this time.


Timing/Duration: today


Severity: moderate


Modifying Factors: Improves With: nothing


Associated Symptoms: abdominal pain, No nausea, No vomiting, No shortness of 

breath, No diaphoresis, No cough, No chills, No headaches, No syncope, No 

seizure


Allergies/Adverse Reactions: 








No Known Drug Allergies Allergy (Verified 10/15/20 23:41)


   





Hx Tetanus, Diphtheria Vaccination/Date Given: Yes


Hx Influenza Vaccination/Date Given: No


Hx Pneumococcal Vaccination/Date Given: No





Travel Risk





- International Travel


Have you traveled outside of the country in past 3 weeks: No





- Coronavirus Screening


Close contact with a COVID-19 positive Pt in past 14-21 Days: No





- Review of Systems


Constitutional: No Symptoms, No Fever, No Chills


Eyes: No Symptoms


Ears, Nose, & Throat: No Symptoms


Respiratory: No Symptoms, No Cough, No Dyspnea


Cardiac: No Symptoms, No Chest Pain, No Edema, No Syncope


Abdominal/Gastrointestinal: No Symptoms, No Abdominal Pain, No Nausea, No 

Vomiting, No Diarrhea


Genitourinary Symptoms: No Symptoms, No Dysuria


Musculoskeletal: No Symptoms, No Back Pain, No Neck Pain


Skin: No Symptoms, No Rash


Neurological: No Symptoms, No Dizziness, No Focal Weakness, No Sensory Changes


Psychological: No Symptoms


Endocrine: No Symptoms


Hematologic/Lymphatic: No Symptoms


Immunological/Allergic: No Symptoms


All Other Systems: Reviewed and Negative





- Past Medical History


Pertinent Past Medical History: Yes


Neurological History: No Pertinent History


ENT History: No Pertinent History


Cardiac History: No Pertinent History


Respiratory History: No Pertinent History


Endocrine Medical History: No Pertinent History


Musculoskeletal History: No Pertinent History


GI Medical History: No Pertinent History


 History: No Pertinent History


Psycho-Social History: Depression


Female Reproductive Disorders: No Pertinent History


Other Medical History: kidney infection, boarder line personal





- Past Surgical History


Past Surgical History: Yes


Neuro Surgical History: No Pertinent History


Cardiac: No Pertinent History


Respiratory: No Pertinent History


Gastrointestinal: Appendectomy


Genitourinary: No Pertinent History


Musculoskeletal: Orthopedic Surgery


Female Surgical History:  Section


Other Surgical History: r elbow





- Social History


Smoking Status: Current every day smoker


How long have you smoked: 4 years


Exposure to second hand smoke: No


Drug Use: none


Patient Lives Alone: No





- Female History


Hx Pregnant Now: No





- Nursing Vital Signs


Nursing Vital Signs: 


                               Initial Vital Signs











Temperature  98.6 F   10/15/20 23:42


 


Pulse Rate  84   10/15/20 23:42


 


Respiratory Rate  18   10/15/20 23:42


 


Blood Pressure  143/82   10/15/20 23:42


 


O2 Sat by Pulse Oximetry  96   10/15/20 23:42








                                   Pain Scale











Pain Intensity                 4

















- Physical Exam


General Appearance: no apparent distress, alert


Eye Exam: PERRL/EOMI, eyes nml inspection


Ears, Nose, Throat Exam: normal ENT inspection, TMs normal, pharynx normal, 

moist mucous membranes, other (Patient burped during physical exam and a foul 

smell of sulfur-like odor emanated from patient's mouth.)


Neck Exam: normal inspection, non-tender, supple, full range of motion


Respiratory Exam: normal breath sounds, lungs clear, No respiratory distress


Cardiovascular Exam: regular rate/rhythm, normal heart sounds, normal peripheral

pulses


Gastrointestinal/Abdomen Exam: soft, normal bowel sounds, distention, other (

Somewhat distended abdomen.  Hyperactive bowel sounds.  No guarding.  No 

pulsatile masses.  No rebound.  No organomegaly observed.), No tenderness, No 

mass


Rectal Exam: other (Rectal exam revealed a nonthrombosed external hemorrhoid.  

Unclear whether or not rectal bleeding is coming from this external hemorrhoid. 

Patient will require outpatient colonoscopy for further evaluation.  No active 

bleeding.)


Back Exam: normal inspection, normal range of motion, No CVA tenderness, No 

vertebral tenderness


Extremity Exam: normal inspection, normal range of motion, pelvis stable


Neurologic Exam: alert, oriented x 3, cooperative, normal mood/affect, nml 

cerebellar function, nml station & gait, sensation nml, No motor deficits


Skin Exam: normal color, warm, dry, No rash


Lymphatic Exam: No adenopathy


**SpO2 Interpretation**: normal


SpO2: 99


O2 Delivery: Room Air





- Course


Nursing assessment & vital signs reviewed: Yes





- CT Exams


  ** Abdomen/Pelvis


CT Interpretation: Tele-radiologist Report (No acute inflammatory or infectious 

process identified in the abdomen or pelvis.  No evidence of bowel obstruction. 

No evidence of diverticulosis or diverticulitis.  A 1.6 x 1.5 cm mildly hyp

erdense left ovarian lesion observed.  Patient will require a nonemergent pelvic

ultrasound for further eval)


Ordered Tests: 


                               Active Orders 24 hr











 Category Date Time Status


 


 IV Insertion STAT Care  10/16/20 00:39 Active


 


 ABDOMEN AND PELVIS W CONTRAST [CT] Stat Exams  10/16/20 00:39 Taken


 


 CBC W DIFF Stat Lab  10/16/20 00:40 Completed


 


 CMP Stat Lab  10/16/20 00:40 Completed


 


 LIPASE Stat Lab  10/16/20 00:40 Completed


 


 UA W/RFX UR CULTURE Stat Lab  10/16/20 00:40 Completed








Medication Summary











Generic Name Dose Route Start Last Admin





  Trade Name Freq  PRN Reason Stop Dose Admin


 


Ceftriaxone Sodium/Dextrose  1 g in 50 mls @ 100 mls/hr  10/16/20 02:29  

10/16/20 02:31





  Rocephin 1 Gm-D5w 50 Ml Bag**  IV  10/16/20 02:58  100 mls/hr





  STAT STA   100 mls/hr





    Administration














Discontinued Medications














Generic Name Dose Route Start Last Admin





  Trade Name Freq  PRN Reason Stop Dose Admin


 


Sodium Chloride  1,000 mls @ 999 mls/hr  10/16/20 00:39  10/16/20 01:59





  Sodium Chloride 0.9% 1000 Ml  IV  10/16/20 01:39  Infused





  .Q1H1M STA   Infusion


 


Sodium Chloride  Confirm  10/16/20 00:52 





  Sodium Chloride 0.9% 1000 Ml  Administered  10/16/20 00:53 





  Dose  





  1,000 mls @ ud  





  .ROUTE  





  .STK-MED ONE  


 


Ceftriaxone Sodium/Dextrose  Confirm  10/16/20 02:28 





  Rocephin 1 Gm-D5w 50 Ml Bag**  Administered  10/16/20 02:29 





  Dose  





  1 g in 50 mls @ ud  





  IV  





  .STK-MED ONE  











Lab/Rad Data: 


                           Laboratory Result Diagrams





                                 10/16/20 00:40 





                                 10/16/20 00:40 





                               Laboratory Results











  10/16/20 10/16/20 10/16/20 Range/Units





  00:40 00:40 00:40 


 


WBC    14.4 H  (4.0-10.5)  K/mm3


 


RBC    5.11  (4.1-5.4)  M/mm3


 


Hgb    14.5  (12.0-16.0)  gm/dl


 


Hct    43.8  (35-47)  %


 


MCV    85.7  ()  fl


 


MCH    28.4  (26-32)  pg


 


MCHC    33.1  (32-36)  g/dl


 


RDW    14.7 H  (11.5-14.0)  %


 


Plt Count    263  (150-450)  K/mm3


 


MPV    9.9  (7.5-11.0)  fl


 


Gran %    65.7  (36.0-66.0)  %


 


Eos # (Auto)    0.27  (0-0.5)  


 


Absolute Lymphs (auto)    3.23  (1.0-4.6)  


 


Absolute Monos (auto)    1.39 H  (0.0-1.3)  


 


Lymphocytes %    22.5 L  (24.0-44.0)  %


 


Monocytes %    9.7  (0.0-12.0)  %


 


Eosinophils %    1.9  (0.00-5.0)  %


 


Basophils %    0.2  (0.0-0.4)  %


 


Absolute Granulocytes    9.44 H  (1.4-6.9)  


 


Basophils #    0.03  (0-0.4)  


 


Sodium   135 L   (137-145)  mmol/L


 


Potassium   4.0   (3.5-5.1)  mmol/L


 


Chloride   106   ()  mmol/L


 


Carbon Dioxide   24   (22-30)  mmol/L


 


Anion Gap   9.8   (5-15)  MEQ/L


 


BUN   9   (7-17)  mg/dL


 


Creatinine   0.60   (0.52-1.04)  mg/dL


 


Estimated GFR   > 60.0   ML/MIN


 


Glucose   125 H   ()  mg/dL


 


Calcium   9.3   (8.4-10.2)  mg/dL


 


Total Bilirubin   0.20   (0.2-1.3)  mg/dL


 


AST   19   (14-36)  U/L


 


ALT   22   (0-35)  U/L


 


Alkaline Phosphatase   69   ()  U/L


 


Serum Total Protein   7.3   (6.3-8.2)  g/dL


 


Albumin   4.2   (3.5-5.0)  g/dL


 


Lipase   68   ()  U/L


 


Urine Color  YELLOW    (YELLOW)  


 


Urine Appearance  SLIGHTLY CLOUDY    (CLEAR)  


 


Urine pH  5.0    (5-6)  


 


Ur Specific Gravity  1.023    (1.005-1.025)  


 


Urine Protein  NEGATIVE    (Negative)  


 


Urine Ketones  NEGATIVE    (NEGATIVE)  


 


Urine Blood  NEGATIVE    (0-5)  Marvin/ul


 


Urine Nitrite  NEGATIVE    (NEGATIVE)  


 


Urine Bilirubin  NEGATIVE    (NEGATIVE)  


 


Urine Urobilinogen  NEGATIVE    (0-1)  mg/dL


 


Ur Leukocyte Esterase  SMALL    (NEGATIVE)  


 


Urine WBC (Auto)  11-15    (0-5)  /HPF


 


Urine RBC (Auto)  6-10    (0-2)  /HPF


 


U Epithel Cells (Auto)  RARE    (FEW)  /HPF


 


Urine Bacteria (Auto)  RARE    (NEGATIVE)  /HPF


 


Urine Mucus (Auto)  SLIGHT    (NEGATIVE)  /HPF


 


Urine Culture Reflexed  NO    (NO)  


 


Urine Glucose  NEGATIVE    (NEGATIVE)  mg/dL














- Progress


Progress: unchanged, improved


Progress Note: 





10/16/20 02:38


Patient reassessed.  Symptoms improved.  Now pain at this time.  No active 

rectal bleeding.  A external hemorrhoid was observed on examination.  Unclear 

whether this is a source of rectal bleeding.  Patient states that her insurance 

has referred her to another physician.  Patient states that she is now referred 

to Dr. Monte and she requested all follow-up be done with Dr. Monte.  

Patient understand that she has a left ovarian lesion and that this lesion will 

require a follow-up outpatient pelvic ultrasound for further evaluation.  She 

will see Dr. Monte to order this outpatient pelvic ultrasound.  Also in light 

of her rectal bleeding patient will require a colonoscopy.  Patient will follow 

up with Dr. Monte to obtain this colonoscopy.  Patient's pulmonary nodule and 

pulmonary calcified granulomas are stable.  UTI was observed in her UA.  An 

associated leukocytosis was also observed which is likely due to this urinary 

tract infection.  Patient received a gram of Rocephin in our ED.  A prescription

 for Keflex was forwarded to patient's pharmacy.  Plan of care discussed with 

patient.  She agrees to follow-up with Dr. Monte within 48 hours for 

reevaluation.


Counseled pt/family regarding: lab results, diagnosis, need for follow-up, rad 

results





- Departure


Departure Disposition: Home


Clinical Impression: 


 Hematochezia, Leukocytosis, UTI (urinary tract infection), Lesion of left 

ovary, Calcified granuloma of lung, Pulmonary nodule, External hemorrhoid, 

Umbilical hernia without mention of obstruction or gangrene





Condition: Stable


Critical Care Time: No


Referrals: 


BHAVESH ROBBINS MD [Primary Care Provider] - 


MATTIE MONTE [ACTIVE STAFF] - 


Instructions:  Gastrointestinal Bleeding (DC)


Additional Instructions: 








Please follow-up with your primary care doctor, Dr. Monte to arrange for an 

outpatient pelvic ultrasound to further evaluate lesion on your left ovary.  

Also follow-up with Dr. Monte to arrange for an outpatient colonoscopy to 

further evaluate your rectal bleeding.  A prescription for antibiotics has been 

forwarded to your pharmacy to treat your urinary tract infection.























Discharge/Care Plan





AFTABYUSUF CROWE was seen on 10/16/20 in the Emergency Room. The patient 

was counseled regarding Diagnosis,Lab results, Imaging studies, need for follow 

up and when to return to the Emergency Room.





Prescriptions given:





Discharge Note





I have spoken with the patient and/or caregivers. I have explained the patient's

condition, diagnosis and treatment plan based on the information available to me

at this time. I have answered the patient's and/or caregiver's questions and 

addressed any concerns. The patient and/or caregivers have as good understanding

of the patient's diagnosis, condition and treatment plan as can be expected at 

this point. The vital signs have been stable. The patient's condition is stable 

and appropriate for discharge from the emergency department.





The patient will pursue further outpatient evaluation with the primary care 

physician or other designated or consulting physician as outlined in the 

discharge instructions. The patient and/or caregivers are agreeable to this plan

of care and follow-up instructions have been explained in detail. The patient 

and/or caregivers have received these instruction. The patient/and or caregivers

are aware that any significant change in condition or worsening of symptoms 

should prompt an immediate return to this or the closest emergency department or

call 911. 








Prescriptions: 


Cephalexin Mh 500 mg** [Keflex 500 mg**] 500 mg PO QID 5 Days #20 capsule

## 2021-04-24 NOTE — XRAY
Indication: Superpubic and bilateral flank pain.  Intermittent vaginal/rectal

bleeding 3 months.



Multiple contiguous axial images obtained through the abdomen and pelvis

without contrast as ordered.



Comparison: June 6, 2013.



Lung bases demonstrates stable right lower lobe calcified granulomas.  Heart

is not enlarged.



Stomach is distended with food/fluid.  Noncontrasted stomach and bowel loops

appear nonobstructed.  Appendectomy reported.  Minimal sigmoid diverticulosis.

 New 3.7 cm left ovary cyst.  Also tiny cul-de-sac fluid presumed physiologic

from rupture/leaking cyst.  No walled off fluid collection or free air.



Remaining liver, gallbladder, pancreas, spleen, adrenal glands, kidneys,

ureters, bladder, uterus, and aorta appear unremarkable for noncontrast exam.



Osseous structures intact.  No ventral or inguinal hernias.



Impression:

1.  New 3.7 cm left ovary cyst with tiny cul-de-sac fluid.

2.  New tiny gallstone and sigmoid diverticulosis.  Again evidence for old

granulomatous disease.

3.  Remaining CT abdomen/pelvis without contrast exam is negative.



CT DI 23.32
Warm/Dry